# Patient Record
Sex: MALE | Race: WHITE
[De-identification: names, ages, dates, MRNs, and addresses within clinical notes are randomized per-mention and may not be internally consistent; named-entity substitution may affect disease eponyms.]

---

## 2018-03-06 ENCOUNTER — HOSPITAL ENCOUNTER (OUTPATIENT)
Age: 18
End: 2018-03-06
Payer: COMMERCIAL

## 2018-03-06 DIAGNOSIS — R55: ICD-10-CM

## 2018-03-06 DIAGNOSIS — R00.2: ICD-10-CM

## 2018-03-06 DIAGNOSIS — R35.8: Primary | ICD-10-CM

## 2018-03-06 DIAGNOSIS — R63.1: ICD-10-CM

## 2018-03-06 LAB
ANION GAP: 8 (ref 5–15)
BUN SERPL-MCNC: 12 MG/DL (ref 7–18)
BUN/CREAT RATIO: 14.4 RATIO (ref 10–20)
CALCIUM SERPL-MCNC: 8.9 MG/DL (ref 8.5–10.1)
CARBON DIOXIDE: 28 MMOL/L (ref 21–32)
CHLORIDE: 105 MMOL/L (ref 98–107)
DEPRECATED RDW RBC: 40.6 FL (ref 35.1–43.9)
DIFFERENTIAL INDICATED: (no result)
ERYTHROCYTE [DISTWIDTH] IN BLOOD: 12.7 % (ref 11.6–14.6)
EST GLOM FILT RATE - AFR AMER: 151 ML/MIN (ref 60–?)
GLUCOSE: 103 MG/DL (ref 74–106)
HCT VFR BLD AUTO: 48.6 % (ref 40–54)
HEMOGLOBIN: 16.6 G/DL (ref 13–16.5)
HGB BLD-MCNC: 16.6 G/DL (ref 13–16.5)
IMMATURE GRANULOCYTES COUNT: 0.02 X10^3/UL (ref 0–0)
MCV RBC: 87.3 FL (ref 80–94)
MEAN CORP HGB CONC: 34.2 G/GL (ref 32–36)
MEAN PLATELET VOL.: 10.4 FL (ref 6.2–12)
MUCOUS THREADS URNS QL MICRO: (no result) /HPF
OSMOLALITY UR: 181 MOSM/KG
PLATELET # BLD: 262 K/MM3 (ref 150–450)
PLATELET COUNT: 262 K/MM3 (ref 150–450)
POSITIVE COUNT: NO
POSITIVE DIFFERENTIAL: NO
POSITIVE MORPHOLOGY: NO
POTASSIUM: 3.9 MMOL/L (ref 3.5–5.1)
RBC # BLD AUTO: 5.57 M/MM3 (ref 4.6–6.2)
RBC DISTRIBUTION WIDTH CV: 12.7 % (ref 11.6–14.6)
RBC DISTRIBUTION WIDTH SD: 40.6 FL (ref 35.1–43.9)
RBC UR QL: (no result) /HPF (ref 0–5)
SP GR UR: 1.01 (ref 1–1.03)
SQUAMOUS URNS QL MICRO: (no result) /HPF (ref 0–5)
URINE PRESERVATIVE: (no result)
WBC # BLD AUTO: 5.9 K/MM3 (ref 4.4–11)
WHITE BLOOD COUNT: 5.9 K/MM3 (ref 4.4–11)

## 2018-03-06 PROCEDURE — 84300 ASSAY OF URINE SODIUM: CPT

## 2018-03-06 PROCEDURE — 83935 ASSAY OF URINE OSMOLALITY: CPT

## 2018-03-06 PROCEDURE — 80048 BASIC METABOLIC PNL TOTAL CA: CPT

## 2018-03-06 PROCEDURE — 84443 ASSAY THYROID STIM HORMONE: CPT

## 2018-03-06 PROCEDURE — 85025 COMPLETE CBC W/AUTO DIFF WBC: CPT

## 2018-03-06 PROCEDURE — 36415 COLL VENOUS BLD VENIPUNCTURE: CPT

## 2018-03-06 PROCEDURE — 81001 URINALYSIS AUTO W/SCOPE: CPT

## 2018-07-14 ENCOUNTER — HOSPITAL ENCOUNTER (EMERGENCY)
Age: 18
Discharge: HOME | End: 2018-07-14
Payer: COMMERCIAL

## 2018-07-14 VITALS
TEMPERATURE: 97.52 F | RESPIRATION RATE: 16 BRPM | DIASTOLIC BLOOD PRESSURE: 72 MMHG | SYSTOLIC BLOOD PRESSURE: 108 MMHG | HEART RATE: 93 BPM | OXYGEN SATURATION: 99 %

## 2018-07-14 VITALS — BODY MASS INDEX: 25.2 KG/M2
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
## 2018-07-14 DIAGNOSIS — S61.210A: Primary | ICD-10-CM
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
## 2018-07-14 PROCEDURE — 12001 RPR S/N/AX/GEN/TRNK 2.5CM/<: CPT

## 2018-07-14 PROCEDURE — 99283 EMERGENCY DEPT VISIT LOW MDM: CPT

## 2019-03-27 ENCOUNTER — HOSPITAL ENCOUNTER (OUTPATIENT)
Age: 19
End: 2019-03-27
Payer: COMMERCIAL

## 2019-03-27 DIAGNOSIS — H53.129: Primary | ICD-10-CM

## 2019-03-27 DIAGNOSIS — R51: ICD-10-CM

## 2019-03-27 PROCEDURE — A9575 INJ GADOTERATE MEGLUMI 0.1ML: HCPCS

## 2019-03-27 PROCEDURE — 70553 MRI BRAIN STEM W/O & W/DYE: CPT

## 2020-12-21 ENCOUNTER — HOSPITAL ENCOUNTER (OUTPATIENT)
Age: 20
End: 2020-12-21
Payer: COMMERCIAL

## 2020-12-21 DIAGNOSIS — N50.82: Primary | ICD-10-CM

## 2020-12-21 PROCEDURE — 76870 US EXAM SCROTUM: CPT

## 2020-12-21 PROCEDURE — 93976 VASCULAR STUDY: CPT

## 2021-05-28 ENCOUNTER — HOSPITAL ENCOUNTER (OUTPATIENT)
Age: 21
End: 2021-05-28
Payer: COMMERCIAL

## 2021-05-28 DIAGNOSIS — Z20.828: Primary | ICD-10-CM

## 2021-05-28 PROCEDURE — U0003 INFECTIOUS AGENT DETECTION BY NUCLEIC ACID (DNA OR RNA); SEVERE ACUTE RESPIRATORY SYNDROME CORONAVIRUS 2 (SARS-COV-2) (CORONAVIRUS DISEASE [COVID-19]), AMPLIFIED PROBE TECHNIQUE, MAKING USE OF HIGH THROUGHPUT TECHNOLOGIES AS DESCRIBED BY CMS-2020-01-R: HCPCS

## 2021-05-28 PROCEDURE — U0005 INFEC AGEN DETEC AMPLI PROBE: HCPCS

## 2021-05-28 PROCEDURE — 87635 SARS-COV-2 COVID-19 AMP PRB: CPT

## 2021-07-17 ENCOUNTER — HOSPITAL ENCOUNTER (EMERGENCY)
Age: 21
Discharge: HOME | End: 2021-07-17
Attending: INTERNAL MEDICINE
Payer: COMMERCIAL

## 2021-07-17 ENCOUNTER — APPOINTMENT (OUTPATIENT)
Dept: GENERAL RADIOLOGY | Age: 21
End: 2021-07-17
Payer: COMMERCIAL

## 2021-07-17 VITALS
RESPIRATION RATE: 18 BRPM | TEMPERATURE: 98.9 F | SYSTOLIC BLOOD PRESSURE: 145 MMHG | OXYGEN SATURATION: 99 % | DIASTOLIC BLOOD PRESSURE: 83 MMHG | HEART RATE: 61 BPM

## 2021-07-17 DIAGNOSIS — I10 ESSENTIAL HYPERTENSION: ICD-10-CM

## 2021-07-17 DIAGNOSIS — S91.312A LACERATION OF LEFT FOOT, INITIAL ENCOUNTER: Primary | ICD-10-CM

## 2021-07-17 PROCEDURE — 2500000003 HC RX 250 WO HCPCS: Performed by: INTERNAL MEDICINE

## 2021-07-17 PROCEDURE — 99283 EMERGENCY DEPT VISIT LOW MDM: CPT

## 2021-07-17 PROCEDURE — 6370000000 HC RX 637 (ALT 250 FOR IP): Performed by: INTERNAL MEDICINE

## 2021-07-17 PROCEDURE — 73630 X-RAY EXAM OF FOOT: CPT

## 2021-07-17 PROCEDURE — 12002 RPR S/N/AX/GEN/TRNK2.6-7.5CM: CPT

## 2021-07-17 RX ORDER — CEPHALEXIN 500 MG/1
500 CAPSULE ORAL 4 TIMES DAILY
Qty: 20 CAPSULE | Refills: 0 | Status: SHIPPED | OUTPATIENT
Start: 2021-07-17 | End: 2021-07-22

## 2021-07-17 RX ORDER — LORATADINE 10 MG/1
10 CAPSULE, LIQUID FILLED ORAL DAILY
COMMUNITY

## 2021-07-17 RX ORDER — GINSENG 100 MG
CAPSULE ORAL ONCE
Status: COMPLETED | OUTPATIENT
Start: 2021-07-17 | End: 2021-07-17

## 2021-07-17 RX ORDER — LIDOCAINE HYDROCHLORIDE 10 MG/ML
10 INJECTION, SOLUTION INFILTRATION; PERINEURAL ONCE
Status: COMPLETED | OUTPATIENT
Start: 2021-07-17 | End: 2021-07-17

## 2021-07-17 RX ORDER — GINSENG 100 MG
CAPSULE ORAL
Status: DISCONTINUED
Start: 2021-07-17 | End: 2021-07-17 | Stop reason: HOSPADM

## 2021-07-17 RX ORDER — IBUPROFEN 200 MG
600 TABLET ORAL ONCE
Status: COMPLETED | OUTPATIENT
Start: 2021-07-17 | End: 2021-07-17

## 2021-07-17 RX ORDER — CEPHALEXIN 500 MG/1
500 CAPSULE ORAL ONCE
Status: COMPLETED | OUTPATIENT
Start: 2021-07-17 | End: 2021-07-17

## 2021-07-17 RX ADMIN — IBUPROFEN 600 MG: 200 TABLET, FILM COATED ORAL at 17:12

## 2021-07-17 RX ADMIN — CEPHALEXIN 500 MG: 500 CAPSULE ORAL at 17:12

## 2021-07-17 RX ADMIN — LIDOCAINE HYDROCHLORIDE 10 ML: 10 INJECTION, SOLUTION INFILTRATION; PERINEURAL at 17:16

## 2021-07-17 RX ADMIN — BACITRACIN: 500 OINTMENT TOPICAL at 17:16

## 2021-07-17 ASSESSMENT — PAIN SCALES - GENERAL
PAINLEVEL_OUTOF10: 0
PAINLEVEL_OUTOF10: 10

## 2021-07-17 NOTE — ED NOTES
This nurse cleansed pt left foot with saline, bacitracin applied and covered with bandage. Pt tolerated well. Pt refused crutches at this time, stating that he will get some from the local drug store. Discharge instructions reviewed and patient and family verbally understands. Pt taken out in wheelchair to vehicle.       Marlon Powell RN  07/17/21 6506

## 2021-07-17 NOTE — LETTER
[facility] ED  [address] [place], [address]  Phone: [phone]               July 17, 2021    Patient: Nathanael Olszewski   YOB: 2000   Date of Visit: 7/17/2021       To Whom It May Concern:    Nathanael Olszewski was seen and treated in our emergency department on 7/17/2021. He may return to work on 7/19/2021.       Sincerely,       Catalina Mendes RN         Signature:__________________________________

## 2021-07-17 NOTE — ED PROVIDER NOTES
[facility] ED  EMERGENCY DEPARTMENT ENCOUNTER      Pt Name: Jonatan Gibson  MRN: [id number]  [place] 2000  Date of evaluation: 7/17/2021  Provider: Caryl Osuna MD    CHIEF COMPLAINT       Chief Complaint   Patient presents with    Laceration     was playing soccer outside without shoes on and someone elses foot slipped onto his and cut him between 2nd and 3rd toe          HISTORY OF PRESENT ILLNESS   (Location/Symptom, Timing/Onset, Context/Setting, Quality, Duration, Modifying Factors, Severity)  Note limiting factors. Jonatan Gibson is a 19 y.o. male who is otherwise healthy presents to the emergency department for evaluation and management of left foot laceration between his second and third toes. He was outside playing soccer barefooted when another player kicked him directly between his toes. Father who is present at bedside is concerned that his foot may have been immersed in dirty water. He took two tylenols before he arrived. He has not been seen by any other providers for it. This patient is being evaluated and treated during the COVID-19 pandemic. There is an area wide shortage of hospital beds. HPI    Nursing Notes were reviewed. REVIEW OF SYSTEMS    (2-9 systems for level 4, 10 or more for level 5)       REVIEW OF SYSTEMS    Constitutional: Negative for fatigue and fever. Respiratory: Negative for cough, chest tightness and shortness of breath. Cardiovascular: Negative for chest pain, palpitations and leg swelling. Gastrointestinal: Negative for abdominal distention, abdominal pain, diarrhea, nausea and vomiting. Musculoskeletal: Positive for left foot pain, negative for other arthralgias, back pain and neck pain. Skin: Positive for left foot laceration, negative for color change, pallor, rash   Allergic/Immunologic: Negative for environmental allergies and food allergies.    Neurological: Negative for head injury, loss of consciousness, dizziness, speech difficulty, weakness, distal tingling/numbness and headaches. Hematological: Negative for adenopathy. Does not bruise/bleed easily. Except as noted above the remainder of the review of systems was reviewed and negative. PASTMEDICAL HISTORY   No past medical history on file. SURGICAL HISTORY     No past surgical history on file. CURRENT MEDICATIONS       Discharge Medication List as of 7/17/2021  5:00 PM      CONTINUE these medications which have NOT CHANGED    Details   loratadine (CLARITIN) 10 MG capsule Take 10 mg by mouth dailyHistorical Med      FIBER COMPLETE PO Take 1 tablet by mouth dailyHistorical Med             ALLERGIES     Bactrim [sulfamethoxazole-trimethoprim]    FAMILY HISTORY     No family history on file. SOCIAL HISTORY       Social History     Socioeconomic History    Marital status: Single     Spouse name: Not on file    Number of children: Not on file    Years of education: Not on file    Highest education level: Not on file   Occupational History    Not on file   Tobacco Use    Smoking status: Not on file   Substance and Sexual Activity    Alcohol use: Not on file    Drug use: Not on file    Sexual activity: Not on file   Other Topics Concern    Not on file   Social History Narrative    Not on file     Social Determinants of Health     Financial Resource Strain:     Difficulty of Paying Living Expenses:    Food Insecurity:     Worried About Running Out of Food in the Last Year:     [address] in the Last Year:    Transportation Needs:     Lack of Transportation (Medical):      Lack of Transportation (Non-Medical):    Physical Activity:     Days of Exercise per Week:     Minutes of Exercise per Session:    Stress:     Feeling of Stress :    Social Connections:     Frequency of Communication with Friends and Family:     Frequency of Social Gatherings with Friends and Family:     Attends Buddhist Services:     Active Member of Clubs or Organizations:     Attends Club or Organization Meetings:     Marital Status:    Intimate Partner Violence:     Fear of Current or Ex-Partner:     Emotionally Abused:     Physically Abused:     Sexually Abused:        SCREENINGS             PHYSICAL EXAM    (up to 7 for level 4, 8 or more for level 5)     ED Triage Vitals [07/17/21 1317]   BP Temp Temp Source Pulse Resp SpO2 Height Weight   (!) 145/83 98.9 °F (37.2 °C) Oral 61 18 99 % -- --       Physical Exam  Physical Exam   Constitutional:  Appears well, well-developed and well-nourished. No distress noted. Non toxic in appearance. Father is present at bedside for support. HENT:     Head: Normocephalic and atraumatic. No palpable tenderness. Nose: Nose normal.     Mouth/Throat: Oropharynx is clear and mucosa moist. No oropharyngeal exudate noted. Posterior pharynx is pink and noninjected. No oral injuries identified including bleeding, dried blood, lacerations, dental fractures, subluxations or avulsions. Eyes: Conjunctivae and EOM are normal. Pupils are equal, round, and reactive to light. No scleral icterus. No tearing or drainage. Neck: Normal range of motion. Neck supple. No tracheal deviation present. No spinous tenderness or step-offs identified on palpation. There is no paraspinous tenderness. No neck pain elicited with axial loading. Cardiovascular: Normal rate, regular rhythm, normal heart sounds and intact distal pulses. Exam reveals no gallop or friction rub. No murmur heard. Pulmonary/Chest: Effort normal and breath sounds are symmetric and normal. No respiratory distress. There are no wheezes, rales or rhonchi. Abdominal: Soft. Bowel sounds are normal. No distension or no mass exhibitted. No organomegaly. There is no tenderness, rebound, rigidity or guarding. Genitourinary:   No CVA tenderness noted on examination. Musculoskeletal: Examination of the spine shows no spinous tenderness or step-offs identified on palpation.  No paraspinous tenderness. Normal range of motion of all extremities and torso. There is normal range of motion of the left foot including toes, passively actively and against resistance. . No edema, tenderness or deformity noted. Lymphadenopathy:  No cervical adenopathy. Neurological:   Alert and oriented to person, place, and time. Reflexes are normal.  There are no cranial nerve deficits. Normal muscle tone, motor and sensory function exhibitted. Strength 5/5 in all extremities and torso. Coordination normal but gait not tested. .   Skin: Examination of the left foot shows that there is a linear 4 cm laceration between the second and third digit extending from the dorsum to the plantar surface. Examination performed in a bloodless field. A couple of foreign bodies identified. Skin is warm and dry. No rash noted. No diaphoresis. No erythema. No pallor. Psychiatric:  normal mood and affect. Behavior is  normal. Judgment and thought content normal.     DIAGNOSTIC RESULTS     EKG: All EKG's are interpreted by the Emergency Department Physician who either signs or Co-signs this chart in the absence of a cardiologist.    Not indicated. RADIOLOGY:   Non-plain film images such as CT, Ultrasoundand MRI are read by the radiologist. Plain radiographic images are visualized and preliminarily interpreted by the emergency physician with the below findings:    Not indicated. Interpretation per the Radiologist below, if available at the time of this note:    XR FOOT LEFT (MIN 3 VIEWS)   Final Result      Chip fracture off the base of the first distal phalanx which appears nonacute. ED BEDSIDE ULTRASOUND:   Performed by ED Physician - none    LABS:  Labs Reviewed - No data to display    All other labs were within normal range or not returned as of this dictation.     EMERGENCY DEPARTMENT COURSE and DIFFERENTIAL DIAGNOSIS/MDM:   Vitals:    Vitals:    07/17/21 1317   BP: (!) 145/83   Pulse: 61   Resp: 18 Temp: 98.9 °F (37.2 °C)   TempSrc: Oral   SpO2: 99%       Noted    MDM    CRITICAL CARE TIME   Total Critical Care time was 0 minutes      [facility]  ED Course as of Jul 18 1638   Sat Jul 17, 2021   1327 He refused Toradol for pain medicine. He refused to have local anesthetic injected at this point in time until repairs are started. [MS]      ED Course User Index  [MS] Edu Sung MD       CONSULTS:  None    PROCEDURES:  Unless otherwise noted below, none     Lac Repair    Date/Time: 7/18/2021 4:30 PM  Performed by: Edu Sung MD  Authorized by: Edu Sung MD     Consent:     Consent obtained:  Verbal    Consent given by:  Patient    Risks discussed:  Infection, pain, poor cosmetic result, poor wound healing, retained foreign body, tendon damage, nerve damage and need for additional repair  Anesthesia (see MAR for exact dosages):      Anesthesia method:  Local infiltration    Local anesthetic:  Lidocaine 1% w/o epi  Laceration details:     Location:  Foot    Foot location:  Top of L foot    Length (cm):  4    Depth (mm):  4  Repair type:     Repair type:  Simple  Pre-procedure details:     Preparation:  Patient was prepped and draped in usual sterile fashion  Exploration:     Hemostasis achieved with:  Direct pressure    Wound exploration: wound explored through full range of motion and entire depth of wound probed and visualized      Wound extent: foreign bodies/material      Wound extent: no muscle damage noted, no nerve damage noted, no tendon damage noted, no underlying fracture noted and no vascular damage noted      Foreign bodies/material:  Two grass particles    Contaminated: no    Treatment:     Area cleansed with:  Betadine    Amount of cleaning:  Standard    Irrigation solution:  Sterile saline    Irrigation volume:  100    Irrigation method:  Pressure wash    Visualized foreign bodies/material removed: yes    Skin repair:     Repair method:  Sutures    Suture size:  4-0    Suture material:  Nylon    Suture technique:  Simple interrupted    Number of sutures:  4  Approximation:     Approximation:  Close  Post-procedure details:     Dressing:  Antibiotic ointment and bulky dressing    Patient tolerance of procedure: Tolerated well, no immediate complications  Comments:      A linear laceration extends on the dorsum of the foot between the second and third toe, between the toes, due to the plantar surface, with a length of approximately 4 cm. It was inspected closely with good light and repeated irrigation until 2 grass particles were removed. Bleeding controlled and inspection was performed in a bloodless field. Shun Buchanan is a 19 y.o. male who presented for left foot laceration after being kicked in a game of soccer between the toes. No evidence of fracture or sensory deficits. No evidence of tendon disruption. It was repaired after careful inspection and irrigation and removal foreign bodies with interrupted sutures. Antibiotics were provided. The area was covered in topical antibiotic and dressing. Crutches were provided to reduce weightbearing. .    She is otherwise well, well hydrated, nontoxic, hemodynamically stable, neurologically intact, and satisfactory for discharge for outpatient management. Findings discussed at length with patient and family. I gave them ample opportunity to ask questions. I instructed them to keep the area clean and dry, change dressing twice a day and inspected for any signs of infection. I instructed the patient to followup with the PCP for evaluation of response to management of acute injury and suture removal.      I instructed the patient to return to the ER if his condition worsens, if there is any concern for altered mental status, difficulty breathing, dehydration or loss of function.         ED Medicationsadministered this visit:    Medications   lidocaine 1 % injection 10 mL (10 mLs Intradermal Given 7/17/21 1716)   cephALEXin (KEFLEX) capsule 500 mg (500 mg Oral Given 7/17/21 1712)   ibuprofen (ADVIL;MOTRIN) tablet 600 mg (600 mg Oral Given 7/17/21 1712)   bacitracin ointment ( Topical Given 7/17/21 1716)       New Prescriptions from this visit:    Discharge Medication List as of 7/17/2021  5:00 PM      START taking these medications    Details   cephALEXin (KEFLEX) 500 MG capsule Take 1 capsule by mouth 4 times daily for 5 days, Disp-20 capsule, R-0Print             Follow-up:  [facility] ED  [address] [facility] [address]  [phone]  Go to   As needed, If symptoms worsen    Doyne Solitario  [address] [facility] [phone]    In 10 days  For suture removal 10-14 days        Final Impression:   1. Laceration of left foot, initial encounter    2. Essential hypertension               (Please note that portions of this note werecompleted with a voice recognition program.  Efforts were made to edit the dictations but occasionally words are mis-transcribed.)    FINAL IMPRESSION      1. Laceration of left foot, initial encounter    2.  Essential hypertension          DISPOSITION/PLAN   DISPOSITION        PATIENT REFERRED TO:  [facility] ED  [address] [facility] [address]  [phone]  Go to   As needed, If symptoms worsen    Doyne Solitario  [address] [facility] [phone]    In 10 days  For suture removal 10-14 days      DISCHARGE MEDICATIONS:  Discharge Medication List as of 7/17/2021  5:00 PM      START taking these medications    Details   cephALEXin (KEFLEX) 500 MG capsule Take 1 capsule by mouth 4 times daily for 5 days, Disp-20 capsule, R-0Print                (Please note that portions of this note were completed with a voice recognition program.  Efforts were made to edit the dictations but occasionally words are mis-transcribed.)    Cheyenne Holbrook MD (electronically signed)  Attending Emergency Physician Dee Tim MD  07/18/21 [address], [place]  07/18/21 [id number]

## 2022-05-12 ENCOUNTER — HOSPITAL ENCOUNTER (OUTPATIENT)
Dept: HOSPITAL 100 - LABSPEC | Age: 22
Discharge: HOME | End: 2022-05-12
Payer: COMMERCIAL

## 2022-05-12 DIAGNOSIS — J02.9: Primary | ICD-10-CM

## 2022-05-12 DIAGNOSIS — J34.89: ICD-10-CM

## 2022-05-12 DIAGNOSIS — R05.9: ICD-10-CM

## 2022-05-12 PROCEDURE — 87635 SARS-COV-2 COVID-19 AMP PRB: CPT

## 2022-05-12 PROCEDURE — U0003 INFECTIOUS AGENT DETECTION BY NUCLEIC ACID (DNA OR RNA); SEVERE ACUTE RESPIRATORY SYNDROME CORONAVIRUS 2 (SARS-COV-2) (CORONAVIRUS DISEASE [COVID-19]), AMPLIFIED PROBE TECHNIQUE, MAKING USE OF HIGH THROUGHPUT TECHNOLOGIES AS DESCRIBED BY CMS-2020-01-R: HCPCS

## 2022-05-12 PROCEDURE — U0005 INFEC AGEN DETEC AMPLI PROBE: HCPCS

## 2025-05-03 ENCOUNTER — HOSPITAL ENCOUNTER (EMERGENCY)
Age: 25
LOS: 1 days | Discharge: HOME | End: 2025-05-04
Payer: COMMERCIAL

## 2025-05-03 VITALS — SYSTOLIC BLOOD PRESSURE: 132 MMHG | DIASTOLIC BLOOD PRESSURE: 70 MMHG

## 2025-05-03 VITALS
SYSTOLIC BLOOD PRESSURE: 128 MMHG | HEART RATE: 76 BPM | RESPIRATION RATE: 17 BRPM | OXYGEN SATURATION: 96 % | DIASTOLIC BLOOD PRESSURE: 58 MMHG

## 2025-05-03 VITALS
OXYGEN SATURATION: 99 % | SYSTOLIC BLOOD PRESSURE: 142 MMHG | HEART RATE: 67 BPM | DIASTOLIC BLOOD PRESSURE: 84 MMHG | RESPIRATION RATE: 9 BRPM

## 2025-05-03 VITALS — HEART RATE: 76 BPM | RESPIRATION RATE: 17 BRPM | OXYGEN SATURATION: 96 %

## 2025-05-03 VITALS
HEART RATE: 74 BPM | OXYGEN SATURATION: 98 % | RESPIRATION RATE: 18 BRPM | DIASTOLIC BLOOD PRESSURE: 105 MMHG | SYSTOLIC BLOOD PRESSURE: 131 MMHG | TEMPERATURE: 98.42 F

## 2025-05-03 VITALS
HEART RATE: 72 BPM | SYSTOLIC BLOOD PRESSURE: 133 MMHG | DIASTOLIC BLOOD PRESSURE: 91 MMHG | RESPIRATION RATE: 15 BRPM | OXYGEN SATURATION: 97 %

## 2025-05-03 VITALS
RESPIRATION RATE: 11 BRPM | DIASTOLIC BLOOD PRESSURE: 67 MMHG | OXYGEN SATURATION: 96 % | SYSTOLIC BLOOD PRESSURE: 142 MMHG | HEART RATE: 70 BPM

## 2025-05-03 VITALS
RESPIRATION RATE: 18 BRPM | DIASTOLIC BLOOD PRESSURE: 71 MMHG | HEART RATE: 72 BPM | SYSTOLIC BLOOD PRESSURE: 131 MMHG | OXYGEN SATURATION: 98 %

## 2025-05-03 VITALS
DIASTOLIC BLOOD PRESSURE: 83 MMHG | RESPIRATION RATE: 15 BRPM | HEART RATE: 67 BPM | SYSTOLIC BLOOD PRESSURE: 132 MMHG | OXYGEN SATURATION: 97 %

## 2025-05-03 VITALS
SYSTOLIC BLOOD PRESSURE: 131 MMHG | HEART RATE: 75 BPM | DIASTOLIC BLOOD PRESSURE: 67 MMHG | RESPIRATION RATE: 17 BRPM | OXYGEN SATURATION: 97 %

## 2025-05-03 VITALS
HEART RATE: 74 BPM | OXYGEN SATURATION: 97 % | SYSTOLIC BLOOD PRESSURE: 131 MMHG | DIASTOLIC BLOOD PRESSURE: 71 MMHG | RESPIRATION RATE: 14 BRPM

## 2025-05-03 VITALS
SYSTOLIC BLOOD PRESSURE: 127 MMHG | DIASTOLIC BLOOD PRESSURE: 70 MMHG | RESPIRATION RATE: 13 BRPM | HEART RATE: 62 BPM | OXYGEN SATURATION: 99 %

## 2025-05-03 VITALS
DIASTOLIC BLOOD PRESSURE: 83 MMHG | RESPIRATION RATE: 20 BRPM | SYSTOLIC BLOOD PRESSURE: 132 MMHG | HEART RATE: 61 BPM | OXYGEN SATURATION: 98 %

## 2025-05-03 VITALS — RESPIRATION RATE: 16 BRPM | HEART RATE: 65 BPM | OXYGEN SATURATION: 97 %

## 2025-05-03 VITALS
SYSTOLIC BLOOD PRESSURE: 122 MMHG | OXYGEN SATURATION: 97 % | RESPIRATION RATE: 17 BRPM | DIASTOLIC BLOOD PRESSURE: 78 MMHG | HEART RATE: 66 BPM

## 2025-05-03 VITALS — OXYGEN SATURATION: 98 % | HEART RATE: 66 BPM | RESPIRATION RATE: 26 BRPM

## 2025-05-03 VITALS
SYSTOLIC BLOOD PRESSURE: 176 MMHG | DIASTOLIC BLOOD PRESSURE: 164 MMHG | OXYGEN SATURATION: 97 % | HEART RATE: 69 BPM | RESPIRATION RATE: 18 BRPM

## 2025-05-03 VITALS
DIASTOLIC BLOOD PRESSURE: 65 MMHG | HEART RATE: 66 BPM | RESPIRATION RATE: 16 BRPM | OXYGEN SATURATION: 97 % | SYSTOLIC BLOOD PRESSURE: 126 MMHG

## 2025-05-03 VITALS
SYSTOLIC BLOOD PRESSURE: 138 MMHG | HEART RATE: 66 BPM | OXYGEN SATURATION: 97 % | DIASTOLIC BLOOD PRESSURE: 73 MMHG | RESPIRATION RATE: 10 BRPM

## 2025-05-03 VITALS
HEART RATE: 71 BPM | SYSTOLIC BLOOD PRESSURE: 134 MMHG | OXYGEN SATURATION: 98 % | DIASTOLIC BLOOD PRESSURE: 67 MMHG | RESPIRATION RATE: 18 BRPM

## 2025-05-03 DIAGNOSIS — R10.9: Primary | ICD-10-CM

## 2025-05-03 DIAGNOSIS — D73.5: ICD-10-CM

## 2025-05-03 LAB
ALBUMIN SERPL-MCNC: 4.8 G/DL (ref 3.5–5)
ALP SERPL-CCNC: 39 U/L (ref 40–129)
ALT SERPL-CCNC: 47 U/L (ref ?–46)
ANION GAP SERPL CALC-SCNC: 11 MMOL/L (ref 5–15)
APTT PPP: 31.2 SECONDS (ref 24.1–36.2)
AST(SGOT): 47 U/L (ref ?–37)
BILIRUB DIRECT SERPL-MCNC: 0.19 MG/DL (ref 0–0.3)
BLOOD UR QL: (no result) /HPF (ref 0–5)
BUN SERPL-MCNC: 21 MG/DL (ref 4–19)
BUN/CREAT SERPL: 24.2 RATIO (ref 10–20)
CALCIUM SERPL-MCNC: 9.6 MG/DL (ref 7.6–11)
CHLORIDE: 103 MMOL/L (ref 98–108)
DEPRECATED RDW RBC: 40.2 FL (ref 35.1–43.9)
ERYTHROCYTE [DISTWIDTH] IN BLOOD: 13.6 % (ref 11.6–14.6)
GLOBULIN: 2.8 G/DL (ref 2.2–4.2)
GLUCOSE SERPL-MCNC: 107 MG/DL (ref 70–99)
HCT VFR BLD AUTO: 45.9 % (ref 40–54)
HGB BLD-MCNC: 16.4 G/DL (ref 13–16.5)
LIPASE: 22 U/L (ref 13–75)
MCV RBC: 81.8 FL (ref 80–94)
MEAN CORP HGB CONC: 35.7 G/DL (ref 32–36)
MEAN PLATELET VOL.: 9.1 FL (ref 6.2–12)
MUCOUS THREADS URNS QL MICRO: (no result) /HPF
NRBC FLAGGED BY ANALYZER: 0 % (ref 0–5)
PLATELET # BLD: 359 K/MM3 (ref 150–450)
POTASSIUM SPEC-MCNC: 4.2 MMOL/L (ref 3.3–5.1)
PROT UR QL STRIP.AUTO: 15 MG/DL
RBC # BLD AUTO: 5.61 M/MM3 (ref 4.6–6.2)
SQUAMOUS URNS QL MICRO: (no result) /HPF (ref 0–5)
URINE PRESERVATIVE: (no result)
WBC # BLD AUTO: 11.4 K/MM3 (ref 4.4–11)

## 2025-05-03 PROCEDURE — 93005 ELECTROCARDIOGRAM TRACING: CPT

## 2025-05-03 PROCEDURE — 99285 EMERGENCY DEPT VISIT HI MDM: CPT

## 2025-05-03 PROCEDURE — 74177 CT ABD & PELVIS W/CONTRAST: CPT

## 2025-05-03 PROCEDURE — 85610 PROTHROMBIN TIME: CPT

## 2025-05-03 PROCEDURE — 96375 TX/PRO/DX INJ NEW DRUG ADDON: CPT

## 2025-05-03 PROCEDURE — 96366 THER/PROPH/DIAG IV INF ADDON: CPT

## 2025-05-03 PROCEDURE — 81001 URINALYSIS AUTO W/SCOPE: CPT

## 2025-05-03 PROCEDURE — 83690 ASSAY OF LIPASE: CPT

## 2025-05-03 PROCEDURE — 80048 BASIC METABOLIC PNL TOTAL CA: CPT

## 2025-05-03 PROCEDURE — A4216 STERILE WATER/SALINE, 10 ML: HCPCS

## 2025-05-03 PROCEDURE — 80076 HEPATIC FUNCTION PANEL: CPT

## 2025-05-03 PROCEDURE — 96376 TX/PRO/DX INJ SAME DRUG ADON: CPT

## 2025-05-03 PROCEDURE — 85025 COMPLETE CBC W/AUTO DIFF WBC: CPT

## 2025-05-03 PROCEDURE — 96365 THER/PROPH/DIAG IV INF INIT: CPT

## 2025-05-03 PROCEDURE — 85730 THROMBOPLASTIN TIME PARTIAL: CPT

## 2025-05-03 PROCEDURE — 96361 HYDRATE IV INFUSION ADD-ON: CPT

## 2025-05-04 VITALS
SYSTOLIC BLOOD PRESSURE: 129 MMHG | TEMPERATURE: 98.2 F | HEART RATE: 68 BPM | OXYGEN SATURATION: 96 % | RESPIRATION RATE: 18 BRPM | DIASTOLIC BLOOD PRESSURE: 57 MMHG

## 2025-05-07 ENCOUNTER — HOSPITAL ENCOUNTER (OUTPATIENT)
Dept: HOSPITAL 100 - LAB | Age: 25
Discharge: HOME | End: 2025-05-07
Payer: COMMERCIAL

## 2025-05-07 DIAGNOSIS — D73.5: Primary | ICD-10-CM

## 2025-05-07 DIAGNOSIS — R21: ICD-10-CM

## 2025-05-07 LAB — CRP SERPL-MCNC: < 3 MG/L (ref 0–3)

## 2025-05-07 PROCEDURE — 86645 CMV ANTIBODY IGM: CPT

## 2025-05-07 PROCEDURE — 86665 EPSTEIN-BARR CAPSID VCA: CPT

## 2025-05-07 PROCEDURE — 85306 CLOT INHIBIT PROT S FREE: CPT

## 2025-05-07 PROCEDURE — 81240 F2 GENE: CPT

## 2025-05-07 PROCEDURE — 86037 ANCA TITER EACH ANTIBODY: CPT

## 2025-05-07 PROCEDURE — 86140 C-REACTIVE PROTEIN: CPT

## 2025-05-07 PROCEDURE — 85303 CLOT INHIBIT PROT C ACTIVITY: CPT

## 2025-05-07 PROCEDURE — 81241 F5 GENE: CPT

## 2025-05-07 PROCEDURE — 36415 COLL VENOUS BLD VENIPUNCTURE: CPT

## 2025-05-07 PROCEDURE — 85652 RBC SED RATE AUTOMATED: CPT

## 2025-05-13 LAB
C-ANCA SER-ACNC: (no result) TITER
CMV IGM SERPL-ACNC: < 30 AU/ML (ref 0–29.9)
EBV VCA IGM SER-ACNC: < 36 U/ML (ref 0–35.9)
LABCORP MISC.: (no result)
P-ANCA TITR SER IF: (no result) TITER
PROT C PPP-ACNC: 131 % (ref 73–180)
PROTEIN S, FUNTIONAL: 90 % (ref 63–140)

## 2025-05-27 ENCOUNTER — HOSPITAL ENCOUNTER (EMERGENCY)
Age: 25
LOS: 1 days | Discharge: HOME | End: 2025-05-28
Payer: COMMERCIAL

## 2025-05-27 VITALS
DIASTOLIC BLOOD PRESSURE: 87 MMHG | SYSTOLIC BLOOD PRESSURE: 120 MMHG | RESPIRATION RATE: 18 BRPM | OXYGEN SATURATION: 98 % | HEART RATE: 85 BPM

## 2025-05-27 VITALS
SYSTOLIC BLOOD PRESSURE: 126 MMHG | OXYGEN SATURATION: 99 % | BODY MASS INDEX: 26 KG/M2 | DIASTOLIC BLOOD PRESSURE: 90 MMHG | HEART RATE: 67 BPM | TEMPERATURE: 96.98 F | RESPIRATION RATE: 18 BRPM

## 2025-05-27 DIAGNOSIS — K21.9: ICD-10-CM

## 2025-05-27 DIAGNOSIS — M79.605: ICD-10-CM

## 2025-05-27 DIAGNOSIS — R10.12: Primary | ICD-10-CM

## 2025-05-27 LAB
ALBUMIN SERPL-MCNC: 4.8 G/DL (ref 3.5–5)
ALP SERPL-CCNC: 37 U/L (ref 40–129)
ALT SERPL-CCNC: 41 U/L (ref ?–46)
ANION GAP SERPL CALC-SCNC: 13 MMOL/L (ref 5–15)
AST(SGOT): 28 U/L (ref ?–37)
BLOOD UR QL: (no result) /HPF (ref 0–5)
BUN SERPL-MCNC: 28 MG/DL (ref 4–19)
BUN/CREAT SERPL: 25.1 RATIO (ref 10–20)
CALCIUM SERPL-MCNC: 9.5 MG/DL (ref 7.6–11)
CHLORIDE: 101 MMOL/L (ref 98–108)
CO2 BLDCV-SCNC: 24.7 MMOL/L (ref 21–32)
DEPRECATED RDW RBC: 39.9 FL (ref 35.1–43.9)
ERYTHROCYTE [DISTWIDTH] IN BLOOD: 13.6 % (ref 11.6–14.6)
ESTIMATED CREATININE CLEARANCE: 107.78 ML/MIN (ref 50–250)
GLOBULIN: 2.5 G/DL (ref 2.2–4.2)
GLUCOSE SERPL-MCNC: 90 MG/DL (ref 70–99)
HCT VFR BLD AUTO: 46.1 % (ref 40–54)
HGB BLD-MCNC: 16.3 G/DL (ref 13–16.5)
LIPASE: 28 U/L (ref 13–75)
MCV RBC: 81.9 FL (ref 80–94)
MEAN CORP HGB CONC: 35.4 G/DL (ref 32–36)
MUCOUS THREADS URNS QL MICRO: (no result) /HPF
NRBC FLAGGED BY ANALYZER: 0 % (ref 0–5)
PLATELET # BLD: 337 K/MM3 (ref 150–450)
PROT UR QL STRIP.AUTO: 15 MG/DL
RBC # BLD AUTO: 5.63 M/MM3 (ref 4.6–6.2)
URINE PRESERVATIVE: (no result)
WBC # BLD AUTO: 8.7 K/MM3 (ref 4.4–11)

## 2025-05-27 PROCEDURE — 85025 COMPLETE CBC W/AUTO DIFF WBC: CPT

## 2025-05-27 PROCEDURE — 74174 CTA ABD&PLVS W/CONTRAST: CPT

## 2025-05-27 PROCEDURE — 93005 ELECTROCARDIOGRAM TRACING: CPT

## 2025-05-27 PROCEDURE — 81001 URINALYSIS AUTO W/SCOPE: CPT

## 2025-05-27 PROCEDURE — 80053 COMPREHEN METABOLIC PANEL: CPT

## 2025-05-27 PROCEDURE — 83605 ASSAY OF LACTIC ACID: CPT

## 2025-05-27 PROCEDURE — 99284 EMERGENCY DEPT VISIT MOD MDM: CPT

## 2025-05-27 PROCEDURE — A4216 STERILE WATER/SALINE, 10 ML: HCPCS

## 2025-05-27 PROCEDURE — 83690 ASSAY OF LIPASE: CPT

## 2025-05-28 VITALS — RESPIRATION RATE: 18 BRPM | HEART RATE: 78 BPM | OXYGEN SATURATION: 98 %

## 2025-05-28 LAB — SQUAMOUS URNS QL MICRO: (no result) /HPF (ref 0–5)

## 2025-06-17 ENCOUNTER — HOSPITAL ENCOUNTER (OUTPATIENT)
Dept: HOSPITAL 100 - LAB | Age: 25
Discharge: HOME | End: 2025-06-17
Payer: COMMERCIAL

## 2025-06-17 DIAGNOSIS — D73.5: ICD-10-CM

## 2025-06-17 DIAGNOSIS — R00.2: Primary | ICD-10-CM

## 2025-06-17 LAB
CHOLEST SERPL-MCNC: 225 MG/DL (ref ?–200)
CHOLESTEROL:HDL RATIO SCREEN: 2.58
D-DIMER QUANTITATIVE (DVT/PE): 0.27 FEU/UG/M (ref 0.27–0.49)
HDLC SERPL-MCNC: 87 MG/DL
LOW DENSITY LIPOPROTEIN CALC.: 122 MG/DL
TRIGLYCERIDES: 77 MG/DL
VLDLC SERPL-MCNC: 15 MG/DL (ref 5–40)

## 2025-06-17 PROCEDURE — 85379 FIBRIN DEGRADATION QUANT: CPT

## 2025-06-17 PROCEDURE — 80061 LIPID PANEL: CPT

## 2025-06-17 PROCEDURE — 36415 COLL VENOUS BLD VENIPUNCTURE: CPT

## 2025-07-09 ENCOUNTER — HOSPITAL ENCOUNTER (OUTPATIENT)
Age: 25
Discharge: HOME | End: 2025-07-09
Payer: COMMERCIAL

## 2025-07-09 DIAGNOSIS — R00.2: Primary | ICD-10-CM

## 2025-07-09 PROCEDURE — 93306 TTE W/DOPPLER COMPLETE: CPT
